# Patient Record
Sex: MALE
[De-identification: names, ages, dates, MRNs, and addresses within clinical notes are randomized per-mention and may not be internally consistent; named-entity substitution may affect disease eponyms.]

---
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
## 2019-11-14 ENCOUNTER — APPOINTMENT (OUTPATIENT)
Dept: INTERNAL MEDICINE | Facility: CLINIC | Age: 19
End: 2019-11-14
Payer: MEDICAID

## 2019-11-14 VITALS
BODY MASS INDEX: 25.62 KG/M2 | TEMPERATURE: 99.4 F | DIASTOLIC BLOOD PRESSURE: 80 MMHG | SYSTOLIC BLOOD PRESSURE: 130 MMHG | WEIGHT: 173 LBS | HEIGHT: 69 IN

## 2019-11-14 DIAGNOSIS — Z78.9 OTHER SPECIFIED HEALTH STATUS: ICD-10-CM

## 2019-11-14 DIAGNOSIS — B36.0 PITYRIASIS VERSICOLOR: ICD-10-CM

## 2019-11-14 DIAGNOSIS — Z00.00 ENCOUNTER FOR GENERAL ADULT MEDICAL EXAMINATION W/OUT ABNORMAL FINDINGS: ICD-10-CM

## 2019-11-14 DIAGNOSIS — Z23 ENCOUNTER FOR IMMUNIZATION: ICD-10-CM

## 2019-11-14 DIAGNOSIS — Z83.3 FAMILY HISTORY OF DIABETES MELLITUS: ICD-10-CM

## 2019-11-14 DIAGNOSIS — Z11.3 ENCOUNTER FOR SCREENING FOR INFECTIONS WITH A PREDOMINANTLY SEXUAL MODE OF TRANSMISSION: ICD-10-CM

## 2019-11-14 PROCEDURE — G0008: CPT

## 2019-11-14 PROCEDURE — 36415 COLL VENOUS BLD VENIPUNCTURE: CPT

## 2019-11-14 PROCEDURE — 90686 IIV4 VACC NO PRSV 0.5 ML IM: CPT

## 2019-11-14 PROCEDURE — 99385 PREV VISIT NEW AGE 18-39: CPT | Mod: 25

## 2019-11-14 RX ORDER — CICLOPIROX 7.7 MG/G
0.77 GEL TOPICAL TWICE DAILY
Qty: 1 | Refills: 0 | Status: DISCONTINUED | COMMUNITY
Start: 2017-08-08 | End: 2019-11-14

## 2019-11-14 RX ORDER — KETOCONAZOLE 20.5 MG/ML
2 SHAMPOO, SUSPENSION TOPICAL DAILY
Qty: 1 | Refills: 0 | Status: DISCONTINUED | COMMUNITY
Start: 2017-08-05 | End: 2019-11-14

## 2019-11-14 NOTE — PLAN
[FreeTextEntry1] : Check routine labs as above.\par Discussed diet, exercise, and weight maintenance. \par Flu shot administered today.\par Sexually active with females, discussed safe sex practices. STD screening at his request.\par Chronic migraines - worse recently, renewed Imitrex. Send for MRI brain.\par Dermatology referral for tinea versicolor management. \par \par RTO in 1 yr for annual exam or earlier PRN for acute care.\par

## 2019-11-14 NOTE — HISTORY OF PRESENT ILLNESS
[FreeTextEntry1] : New patient physical [de-identified] : 19 year old M comes to establish medical care and for an annual exam. Previously followed by his pediatrician.\par \par History of chronic migraines since early childhood. Uses Sumatriptan PRN and needs refill. No prior brain imaging studies. \par

## 2019-11-14 NOTE — PHYSICAL EXAM
[No Acute Distress] : no acute distress [Well Nourished] : well nourished [Well Developed] : well developed [Well-Appearing] : well-appearing [Normal Sclera/Conjunctiva] : normal sclera/conjunctiva [PERRL] : pupils equal round and reactive to light [EOMI] : extraocular movements intact [Normal Outer Ear/Nose] : the outer ears and nose were normal in appearance [Normal Oropharynx] : the oropharynx was normal [Normal TMs] : both tympanic membranes were normal [Supple] : supple [No Lymphadenopathy] : no lymphadenopathy [No Respiratory Distress] : no respiratory distress  [Thyroid Normal, No Nodules] : the thyroid was normal and there were no nodules present [No Accessory Muscle Use] : no accessory muscle use [Clear to Auscultation] : lungs were clear to auscultation bilaterally [Normal Rate] : normal rate  [Regular Rhythm] : with a regular rhythm [Normal S1, S2] : normal S1 and S2 [No Murmur] : no murmur heard [No Varicosities] : no varicosities [Pedal Pulses Present] : the pedal pulses are present [No Edema] : there was no peripheral edema [Soft] : abdomen soft [Non Tender] : non-tender [Non-distended] : non-distended [Normal Bowel Sounds] : normal bowel sounds [Normal Posterior Cervical Nodes] : no posterior cervical lymphadenopathy [Normal Anterior Cervical Nodes] : no anterior cervical lymphadenopathy [No CVA Tenderness] : no CVA  tenderness [No Joint Swelling] : no joint swelling [No Spinal Tenderness] : no spinal tenderness [Grossly Normal Strength/Tone] : grossly normal strength/tone [No Rash] : no rash [Coordination Grossly Intact] : coordination grossly intact [Normal Gait] : normal gait [No Focal Deficits] : no focal deficits [Normal Insight/Judgement] : insight and judgment were intact [Deep Tendon Reflexes (DTR)] : deep tendon reflexes were 2+ and symmetric [Normal Affect] : the affect was normal [de-identified] : left occluded cerumen [Normal Mood] : the mood was normal [de-identified] : friendly young male [de-identified] : hypopigmented lesions along shoulders and back

## 2019-11-14 NOTE — HISTORY OF PRESENT ILLNESS
[de-identified] : 19 year old M comes to establish medical care and for an annual exam. Previously followed by his pediatrician.\par \par History of chronic migraines since early childhood. Uses Sumatriptan PRN and needs refill. No prior brain imaging studies. \par  [FreeTextEntry1] : New patient physical

## 2019-11-14 NOTE — HEALTH RISK ASSESSMENT
[Excellent] : ~his/her~  mood as  excellent [No] : In the past 12 months have you used drugs other than those required for medical reasons? No [No falls in past year] : Patient reported no falls in the past year [0] : 2) Feeling down, depressed, or hopeless: Not at all (0) [] : No

## 2019-11-14 NOTE — PHYSICAL EXAM
[No Acute Distress] : no acute distress [Well Nourished] : well nourished [Well Developed] : well developed [Well-Appearing] : well-appearing [Normal Sclera/Conjunctiva] : normal sclera/conjunctiva [EOMI] : extraocular movements intact [PERRL] : pupils equal round and reactive to light [Normal Outer Ear/Nose] : the outer ears and nose were normal in appearance [Normal TMs] : both tympanic membranes were normal [Normal Oropharynx] : the oropharynx was normal [Supple] : supple [No Lymphadenopathy] : no lymphadenopathy [No Respiratory Distress] : no respiratory distress  [Thyroid Normal, No Nodules] : the thyroid was normal and there were no nodules present [Clear to Auscultation] : lungs were clear to auscultation bilaterally [No Accessory Muscle Use] : no accessory muscle use [Regular Rhythm] : with a regular rhythm [Normal S1, S2] : normal S1 and S2 [Normal Rate] : normal rate  [No Murmur] : no murmur heard [No Varicosities] : no varicosities [No Edema] : there was no peripheral edema [Pedal Pulses Present] : the pedal pulses are present [Soft] : abdomen soft [Non Tender] : non-tender [Non-distended] : non-distended [Normal Bowel Sounds] : normal bowel sounds [Normal Posterior Cervical Nodes] : no posterior cervical lymphadenopathy [Normal Anterior Cervical Nodes] : no anterior cervical lymphadenopathy [No CVA Tenderness] : no CVA  tenderness [No Spinal Tenderness] : no spinal tenderness [Grossly Normal Strength/Tone] : grossly normal strength/tone [No Joint Swelling] : no joint swelling [Coordination Grossly Intact] : coordination grossly intact [No Rash] : no rash [Normal Gait] : normal gait [No Focal Deficits] : no focal deficits [Normal Insight/Judgement] : insight and judgment were intact [Normal Affect] : the affect was normal [Deep Tendon Reflexes (DTR)] : deep tendon reflexes were 2+ and symmetric [Normal Mood] : the mood was normal [de-identified] : left occluded cerumen [de-identified] : friendly young male [de-identified] : hypopigmented lesions along shoulders and back

## 2019-11-20 ENCOUNTER — APPOINTMENT (OUTPATIENT)
Dept: MRI IMAGING | Facility: CLINIC | Age: 19
End: 2019-11-20

## 2019-11-22 ENCOUNTER — APPOINTMENT (OUTPATIENT)
Dept: MRI IMAGING | Facility: CLINIC | Age: 19
End: 2019-11-22

## 2019-11-25 LAB
ALBUMIN SERPL ELPH-MCNC: 5.1 G/DL
ALP BLD-CCNC: 86 U/L
ALT SERPL-CCNC: 25 U/L
ANION GAP SERPL CALC-SCNC: 14 MMOL/L
APPEARANCE: CLEAR
AST SERPL-CCNC: 20 U/L
BACTERIA: NEGATIVE
BASOPHILS # BLD AUTO: 0.02 K/UL
BASOPHILS NFR BLD AUTO: 0.4 %
BILIRUB SERPL-MCNC: 0.6 MG/DL
BILIRUBIN URINE: NEGATIVE
BLOOD URINE: NEGATIVE
BUN SERPL-MCNC: 10 MG/DL
C TRACH RRNA SPEC QL NAA+PROBE: NOT DETECTED
CALCIUM SERPL-MCNC: 10.1 MG/DL
CHLORIDE SERPL-SCNC: 100 MMOL/L
CHOLEST SERPL-MCNC: 153 MG/DL
CHOLEST/HDLC SERPL: 2.9 RATIO
CO2 SERPL-SCNC: 27 MMOL/L
COLOR: YELLOW
CREAT SERPL-MCNC: 1.09 MG/DL
EOSINOPHIL # BLD AUTO: 0.05 K/UL
EOSINOPHIL NFR BLD AUTO: 0.9 %
ESTIMATED AVERAGE GLUCOSE: 103 MG/DL
GLUCOSE QUALITATIVE U: NEGATIVE
GLUCOSE SERPL-MCNC: 102 MG/DL
HAV IGM SER QL: NONREACTIVE
HBA1C MFR BLD HPLC: 5.2 %
HBV CORE IGM SER QL: NONREACTIVE
HBV SURFACE AG SER QL: NONREACTIVE
HCT VFR BLD CALC: 54.9 %
HCV AB SER QL: NONREACTIVE
HCV S/CO RATIO: 0.11 S/CO
HDLC SERPL-MCNC: 53 MG/DL
HGB BLD-MCNC: 18.4 G/DL
HIV1+2 AB SPEC QL IA.RAPID: NONREACTIVE
HSV 1+2 IGG SER IA-IMP: NEGATIVE
HSV 1+2 IGG SER IA-IMP: NEGATIVE
HSV1 IGG SER QL: 0.44 INDEX
HSV1 IGM SER QL: NORMAL TITER
HSV2 AB FLD-ACNC: NORMAL TITER
HSV2 IGG SER QL: 0.09 INDEX
HYALINE CASTS: 0 /LPF
IMM GRANULOCYTES NFR BLD AUTO: 0.2 %
KETONES URINE: NEGATIVE
LDLC SERPL CALC-MCNC: 87 MG/DL
LEUKOCYTE ESTERASE URINE: NEGATIVE
LYMPHOCYTES # BLD AUTO: 2.41 K/UL
LYMPHOCYTES NFR BLD AUTO: 44 %
MAN DIFF?: NORMAL
MCHC RBC-ENTMCNC: 32.5 PG
MCHC RBC-ENTMCNC: 33.5 GM/DL
MCV RBC AUTO: 97 FL
MICROSCOPIC-UA: NORMAL
MONOCYTES # BLD AUTO: 0.41 K/UL
MONOCYTES NFR BLD AUTO: 7.5 %
N GONORRHOEA RRNA SPEC QL NAA+PROBE: NOT DETECTED
NEUTROPHILS # BLD AUTO: 2.58 K/UL
NEUTROPHILS NFR BLD AUTO: 47 %
NITRITE URINE: NEGATIVE
PH URINE: 7.5
PLATELET # BLD AUTO: 237 K/UL
POTASSIUM SERPL-SCNC: 4.4 MMOL/L
PROT SERPL-MCNC: 7.7 G/DL
PROTEIN URINE: NORMAL
RBC # BLD: 5.66 M/UL
RBC # FLD: 11.8 %
RED BLOOD CELLS URINE: 4 /HPF
SODIUM SERPL-SCNC: 141 MMOL/L
SOURCE AMPLIFICATION: NORMAL
SPECIFIC GRAVITY URINE: 1.02
SQUAMOUS EPITHELIAL CELLS: 0 /HPF
T PALLIDUM AB SER QL IA: NEGATIVE
T4 FREE SERPL-MCNC: 1 NG/DL
TRIGL SERPL-MCNC: 67 MG/DL
TSH SERPL-ACNC: 1.62 UIU/ML
UROBILINOGEN URINE: NORMAL
WBC # FLD AUTO: 5.48 K/UL
WHITE BLOOD CELLS URINE: 0 /HPF

## 2019-11-26 ENCOUNTER — RESULT REVIEW (OUTPATIENT)
Age: 19
End: 2019-11-26

## 2019-12-06 ENCOUNTER — RESULT REVIEW (OUTPATIENT)
Age: 19
End: 2019-12-06

## 2020-01-08 ENCOUNTER — APPOINTMENT (OUTPATIENT)
Dept: INTERNAL MEDICINE | Facility: CLINIC | Age: 20
End: 2020-01-08

## 2020-08-12 ENCOUNTER — APPOINTMENT (OUTPATIENT)
Dept: INTERNAL MEDICINE | Facility: CLINIC | Age: 20
End: 2020-08-12
Payer: MEDICAID

## 2020-08-12 VITALS
BODY MASS INDEX: 25.92 KG/M2 | OXYGEN SATURATION: 99 % | DIASTOLIC BLOOD PRESSURE: 70 MMHG | WEIGHT: 175 LBS | TEMPERATURE: 98.5 F | HEIGHT: 69 IN | SYSTOLIC BLOOD PRESSURE: 140 MMHG | HEART RATE: 65 BPM

## 2020-08-12 VITALS — DIASTOLIC BLOOD PRESSURE: 90 MMHG | SYSTOLIC BLOOD PRESSURE: 140 MMHG

## 2020-08-12 DIAGNOSIS — K21.9 GASTRO-ESOPHAGEAL REFLUX DISEASE W/OUT ESOPHAGITIS: ICD-10-CM

## 2020-08-12 DIAGNOSIS — G43.909 MIGRAINE, UNSPECIFIED, NOT INTRACTABLE, W/OUT STATUS MIGRAINOSUS: ICD-10-CM

## 2020-08-12 PROCEDURE — 99214 OFFICE O/P EST MOD 30 MIN: CPT

## 2020-08-12 NOTE — PHYSICAL EXAM
[Normal] : normal rate, regular rhythm, normal S1 and S2 and no murmur heard [No Edema] : there was no peripheral edema [Soft] : abdomen soft [Non Tender] : non-tender [No Rash] : no rash [Non-distended] : non-distended [Normal Bowel Sounds] : normal bowel sounds [Normal Mood] : the mood was normal [Normal Gait] : normal gait [de-identified] : appears well

## 2020-08-12 NOTE — HISTORY OF PRESENT ILLNESS
[FreeTextEntry1] : migraine HA [de-identified] : Patient needs refill on Imitrex for migraines.\par \par He sometimes vomits bile after brushing teeth in morning. No abdominal, change in appetite, or diarrhea. Sometimes has heartburn. He has metallic taste in mouth. He eats spicy food often.

## 2021-02-16 ENCOUNTER — RX RENEWAL (OUTPATIENT)
Age: 21
End: 2021-02-16

## 2021-02-16 RX ORDER — FAMOTIDINE 40 MG/1
40 TABLET, FILM COATED ORAL
Qty: 90 | Refills: 1 | Status: ACTIVE | COMMUNITY
Start: 2020-08-12 | End: 1900-01-01

## 2021-05-20 ENCOUNTER — APPOINTMENT (OUTPATIENT)
Dept: INTERNAL MEDICINE | Facility: CLINIC | Age: 21
End: 2021-05-20
Payer: MEDICAID

## 2021-05-20 VITALS — SYSTOLIC BLOOD PRESSURE: 110 MMHG | TEMPERATURE: 98 F | DIASTOLIC BLOOD PRESSURE: 72 MMHG

## 2021-05-20 DIAGNOSIS — F23 BRIEF PSYCHOTIC DISORDER: ICD-10-CM

## 2021-05-20 DIAGNOSIS — G47.00 INSOMNIA, UNSPECIFIED: ICD-10-CM

## 2021-05-20 PROCEDURE — 99072 ADDL SUPL MATRL&STAF TM PHE: CPT

## 2021-05-20 PROCEDURE — 99214 OFFICE O/P EST MOD 30 MIN: CPT
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
## 2021-05-20 NOTE — REVIEW OF SYSTEMS
[Negative] : Heme/Lymph [Insomnia] : insomnia [Headache] : no headache [Suicidal] : not suicidal [Anxiety] : no anxiety [Depression] : no depression

## 2021-05-20 NOTE — PLAN
[FreeTextEntry1] : Patient's clinical picture today was concerning for acute psychosis with paranoia.\par 911 was called. Patient was escorted by EMS and police to the ambulance, and will be transported to the emergency room for a psychiatric evaluation. \par I will try to contact the hospital (likely [facility]) for a clinical update on patient's condition. \par I discussed the situation with patient's mother, sister, and step-father.\par
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
## 2021-05-24 ENCOUNTER — NON-APPOINTMENT (OUTPATIENT)
Age: 21
End: 2021-05-24

## 2022-05-10 NOTE — PLAN
[FreeTextEntry1] : Sumatriptan renewed.\par Start Pepcid 40 mg daily for suspected GERD. Reduce spicy food in diet. \par \par RTO 11/20 for CPE.
None

## 2023-02-13 ENCOUNTER — APPOINTMENT (OUTPATIENT)
Dept: CARDIOLOGY | Facility: CLINIC | Age: 23
End: 2023-02-13

## 2023-02-18 ENCOUNTER — OUTPATIENT (OUTPATIENT)
Dept: OUTPATIENT SERVICES | Facility: HOSPITAL | Age: 23
LOS: 1 days | End: 2023-02-18
Payer: COMMERCIAL

## 2023-02-18 DIAGNOSIS — F20.9 SCHIZOPHRENIA, UNSPECIFIED: ICD-10-CM

## 2023-02-18 PROCEDURE — 0225U NFCT DS DNA&RNA 21 SARSCOV2: CPT

## 2023-06-05 ENCOUNTER — APPOINTMENT (OUTPATIENT)
Dept: CARDIOLOGY | Facility: CLINIC | Age: 23
End: 2023-06-05

## 2023-09-14 ENCOUNTER — APPOINTMENT (OUTPATIENT)
Dept: INTERNAL MEDICINE | Facility: CLINIC | Age: 23
End: 2023-09-14